# Patient Record
Sex: MALE | Race: WHITE | URBAN - METROPOLITAN AREA
[De-identification: names, ages, dates, MRNs, and addresses within clinical notes are randomized per-mention and may not be internally consistent; named-entity substitution may affect disease eponyms.]

---

## 2017-03-06 ENCOUNTER — OFFICE VISIT (OUTPATIENT)
Dept: OTOLARYNGOLOGY | Facility: CLINIC | Age: 62
End: 2017-03-06

## 2017-03-06 VITALS
HEART RATE: 78 BPM | DIASTOLIC BLOOD PRESSURE: 76 MMHG | SYSTOLIC BLOOD PRESSURE: 114 MMHG | WEIGHT: 240 LBS | BODY MASS INDEX: 33.98 KG/M2 | TEMPERATURE: 98 F | HEIGHT: 70.5 IN

## 2017-03-06 DIAGNOSIS — R22.1 NECK MASS: Primary | ICD-10-CM

## 2017-03-06 PROCEDURE — 99203 OFFICE O/P NEW LOW 30 MIN: CPT | Performed by: OTOLARYNGOLOGY

## 2017-03-06 PROCEDURE — 99212 OFFICE O/P EST SF 10 MIN: CPT | Performed by: OTOLARYNGOLOGY

## 2017-03-07 ENCOUNTER — TELEPHONE (OUTPATIENT)
Dept: OTOLARYNGOLOGY | Facility: CLINIC | Age: 62
End: 2017-03-07

## 2017-03-07 RX ORDER — SULFAMETHOXAZOLE AND TRIMETHOPRIM 800; 160 MG/1; MG/1
1 TABLET ORAL EVERY 12 HOURS
Qty: 20 TABLET | Refills: 0 | Status: SHIPPED | OUTPATIENT
Start: 2017-03-07 | End: 2017-03-17

## 2017-03-07 NOTE — TELEPHONE ENCOUNTER
Order and LOV note faxed to Dr. Sanchez See office; confirmation rec'd. Per Dr. Yoko perry, it will take a few hours for the order and LOV note to be rec'd through their EMR.   Pt would like to know if he needs to pick anything up from Dr. Yoko perry

## 2017-03-07 NOTE — TELEPHONE ENCOUNTER
Patient calling again states was told to call pcp's office for prior authorization for ct scan but when he called pcp was told to call our office to obtain prior auth since JDO referred patient to get CT scan.  Patient already scheduled CT at [facility]

## 2017-03-08 NOTE — TELEPHONE ENCOUNTER
Pt informed we rec'd a faxed order from Dr. Godwin Player office RE:  Order for CT SOFT TISSUE NECK. Order faxed to [address]; confirmation rec'd.

## 2017-03-09 ENCOUNTER — TELEPHONE (OUTPATIENT)
Dept: OTOLARYNGOLOGY | Facility: CLINIC | Age: 62
End: 2017-03-09

## 2017-03-09 NOTE — TELEPHONE ENCOUNTER
Pt dropped disc of CT Neck W/ Contrast at the [facility] office, disc placed in [employer] mailbox. Pt would like a CB once disc has been reviewed.

## 2017-03-10 ENCOUNTER — TELEPHONE (OUTPATIENT)
Dept: OTOLARYNGOLOGY | Facility: CLINIC | Age: 62
End: 2017-03-10

## 2017-03-10 NOTE — TELEPHONE ENCOUNTER
Informed pt JDO is out of the office until next week and that he may schedule a f/u appt w/ JDO to review results. Pt verbalized understanding; scheduled f/u visit w/ JDO for 3/13.

## 2017-03-10 NOTE — TELEPHONE ENCOUNTER
pt called. CT of his Neck was done at [facility]. He dropped off his test results on a CD disc yesterday to the [facility] office. He states this is painful and is asking for the test results. Please advise.

## 2017-03-13 ENCOUNTER — OFFICE VISIT (OUTPATIENT)
Dept: OTOLARYNGOLOGY | Facility: CLINIC | Age: 62
End: 2017-03-13

## 2017-03-13 VITALS
HEIGHT: 70 IN | TEMPERATURE: 99 F | WEIGHT: 240 LBS | BODY MASS INDEX: 34.36 KG/M2 | SYSTOLIC BLOOD PRESSURE: 121 MMHG | DIASTOLIC BLOOD PRESSURE: 86 MMHG

## 2017-03-13 DIAGNOSIS — R22.1 NECK MASS: Primary | ICD-10-CM

## 2017-03-13 PROCEDURE — 99212 OFFICE O/P EST SF 10 MIN: CPT | Performed by: OTOLARYNGOLOGY

## 2017-03-13 PROCEDURE — 99214 OFFICE O/P EST MOD 30 MIN: CPT | Performed by: OTOLARYNGOLOGY

## 2017-03-13 RX ORDER — PENICILLIN V POTASSIUM 500 MG/1
TABLET ORAL
Refills: 0 | COMMUNITY
Start: 2017-03-06

## 2017-03-13 RX ORDER — IBUPROFEN 800 MG/1
TABLET ORAL
Refills: 0 | COMMUNITY
Start: 2017-03-06

## 2017-03-14 ENCOUNTER — TELEPHONE (OUTPATIENT)
Dept: OTOLARYNGOLOGY | Facility: CLINIC | Age: 62
End: 2017-03-14

## 2017-03-14 NOTE — PROGRESS NOTES
Patrice Jade is a 57year old male. Patient presents with:  Results: CT soft tissue of neck done 3-9      HISTORY OF PRESENT ILLNESS  3-4 year history of a stable right neck pass posteriorly. Recently has increased in size and is very tender recently.  No a and affect. Neck Exam Normal Inspection - Posterior neck mass on the right side. on posterior neck.  . Parotid gland - Normal. Thyroid gland - Normal.   Eyes Normal Conjunctiva - Right: Normal, Left: Normal. Pupil - Right: Normal, Left: Normal. Fundus - R

## 2017-03-14 NOTE — TELEPHONE ENCOUNTER
Patient calling to schedule surgery with BRIEN -- patient states spoke with Jose Briggs yesterday and stated that there was an opening for this Thursday 03/16 for surgery would like to know if he can schedule then. please advise.  Thank you

## 2017-03-14 NOTE — TELEPHONE ENCOUNTER
Pt contacted, scheduled surgery for 4/6/17 with Dr. Yumiko Lindo. Pre-post op instructions reviewed.

## 2017-03-29 ENCOUNTER — TELEPHONE (OUTPATIENT)
Dept: OTOLARYNGOLOGY | Facility: CLINIC | Age: 62
End: 2017-03-29

## 2017-03-29 NOTE — TELEPHONE ENCOUNTER
pt called. He is scheduled for surgery 4/6/17 with JDO. pt wants to be sure his insurance is in order, need any pre-op testing and is surgery is a go. Please call. Thank you.

## 2017-03-31 ENCOUNTER — TELEPHONE (OUTPATIENT)
Dept: OTOLARYNGOLOGY | Facility: CLINIC | Age: 62
End: 2017-03-31

## 2017-03-31 RX ORDER — CLINDAMYCIN HYDROCHLORIDE 300 MG/1
300 CAPSULE ORAL 3 TIMES DAILY
Qty: 21 CAPSULE | Refills: 0 | Status: SHIPPED | OUTPATIENT
Start: 2017-03-31 | End: 2017-04-07

## 2017-03-31 NOTE — TELEPHONE ENCOUNTER
Per pt the cyst on the right side if his neck has been growing in size over the last 2 weeks. Today it started draining pus and blood and pt has been continuously changing bandages.  Pt stated the area around the cyst is red and purple and has developed sma

## 2017-03-31 NOTE — TELEPHONE ENCOUNTER
pt called. Is to have surgery 4/6/17 for a cyst on neck. Cyst has grown in size, its leaking a lot with blood and gunk. Very, Very painful. He is pretty certain its infected.   Tried to transferred, was asked to take a TE.

## 2017-04-01 ENCOUNTER — OFFICE VISIT (OUTPATIENT)
Dept: OTOLARYNGOLOGY | Facility: CLINIC | Age: 62
End: 2017-04-01

## 2017-04-01 VITALS
DIASTOLIC BLOOD PRESSURE: 80 MMHG | SYSTOLIC BLOOD PRESSURE: 140 MMHG | BODY MASS INDEX: 33 KG/M2 | TEMPERATURE: 98 F | WEIGHT: 230 LBS

## 2017-04-01 DIAGNOSIS — L02.11 ABSCESS OF NECK: Primary | ICD-10-CM

## 2017-04-01 PROCEDURE — 10061 I&D ABSCESS COMP/MULTIPLE: CPT | Performed by: OTOLARYNGOLOGY

## 2017-04-01 PROCEDURE — 99214 OFFICE O/P EST MOD 30 MIN: CPT | Performed by: OTOLARYNGOLOGY

## 2017-04-01 NOTE — PROGRESS NOTES
Jeanie Jackson is a 57year old male. Patient presents with:  Lump: Lump on his neck back side ,currently infected and taking antibiotics. HISTORY OF PRESENT ILLNESS  3-4 year history of a stable right neck pass posteriorly.  Recently has increased in si depression. Integumentary Negative Frequent skin infections, pigment change and rash. Hema/Lymph Negative Easy bleeding and easy bruising.            PHYSICAL EXAM    /80 mmHg  Temp(Src) 97.7 °F (36.5 °C)  Wt 230 lb (104.327 kg)       Constitution was widely opened using hemostats. The cavity was cleared of debris and pus using suction. This was irrigated with roughly 150 cc of saline as well. The cavity was packed with iodoform gauze. No complications were noted.       Current outpatient prescriptio

## 2017-04-03 ENCOUNTER — OFFICE VISIT (OUTPATIENT)
Dept: OTOLARYNGOLOGY | Facility: CLINIC | Age: 62
End: 2017-04-03

## 2017-04-03 VITALS
HEIGHT: 70 IN | WEIGHT: 236 LBS | BODY MASS INDEX: 33.79 KG/M2 | DIASTOLIC BLOOD PRESSURE: 93 MMHG | SYSTOLIC BLOOD PRESSURE: 151 MMHG | TEMPERATURE: 98 F | HEART RATE: 69 BPM

## 2017-04-03 DIAGNOSIS — L02.11 ABSCESS OF NECK: Primary | ICD-10-CM

## 2017-04-03 PROCEDURE — 99213 OFFICE O/P EST LOW 20 MIN: CPT | Performed by: OTOLARYNGOLOGY

## 2017-04-03 PROCEDURE — 99024 POSTOP FOLLOW-UP VISIT: CPT | Performed by: OTOLARYNGOLOGY

## 2017-04-03 NOTE — PROGRESS NOTES
Marycarmen Mclaughlin is a 57year old male. Patient presents with: Follow - Up: regarding abscess of neck, improvement in symptoms after I&D      HISTORY OF PRESENT ILLNESS    3-4 year history of a stable right neck pass posteriorly.  Recently has increased in [address] pain and diarrhea. Endocrine Negative Cold intolerance and heat intolerance. Neuro Negative Tremors. Psych Negative Anxiety and depression. Integumentary Negative Frequent skin infections, pigment change and rash.    Hema/Lymph Negative Easy bleedin 0  •  penicillin v potassium 500 MG Oral Tab, , Disp: , Rfl: 0  •  aspirin 81 MG Oral Tab, Take 81 mg by mouth daily. , Disp: , Rfl:   ASSESSMENT AND PLAN    1. Abscess of neck  Packing withdrawn 3 inches. Culture taken. Will take to [facility].   Continue packing

## 2017-04-07 ENCOUNTER — TELEPHONE (OUTPATIENT)
Dept: OTOLARYNGOLOGY | Facility: CLINIC | Age: 62
End: 2017-04-07

## 2017-04-07 NOTE — TELEPHONE ENCOUNTER
Pt is post op day 1 Incision and drainage of neck mass, per pt is doing ok small amount of oozing and bleeding from incision site, per pt has change dressing last night and this morning and per pt he does not have any pain  And pt is taking antibiotic as p

## 2017-04-10 ENCOUNTER — OFFICE VISIT (OUTPATIENT)
Dept: OTOLARYNGOLOGY | Facility: CLINIC | Age: 62
End: 2017-04-10

## 2017-04-10 VITALS
BODY MASS INDEX: 32.99 KG/M2 | TEMPERATURE: 98 F | WEIGHT: 233 LBS | SYSTOLIC BLOOD PRESSURE: 128 MMHG | HEIGHT: 70.5 IN | DIASTOLIC BLOOD PRESSURE: 84 MMHG

## 2017-04-10 DIAGNOSIS — L02.11 ABSCESS OF NECK: Primary | ICD-10-CM

## 2017-04-10 PROCEDURE — 99212 OFFICE O/P EST SF 10 MIN: CPT | Performed by: OTOLARYNGOLOGY

## 2017-04-10 PROCEDURE — 99024 POSTOP FOLLOW-UP VISIT: CPT | Performed by: OTOLARYNGOLOGY

## 2017-04-10 RX ORDER — SULFAMETHOXAZOLE AND TRIMETHOPRIM 800; 160 MG/1; MG/1
TABLET ORAL
Refills: 0 | COMMUNITY
Start: 2017-04-06

## 2017-04-11 NOTE — PROGRESS NOTES
Arabella Cain is a 57year old male. Patient presents with:  Post-Op: excision and reconstruction neck mass done on 4-6, pt is doing well       HISTORY OF PRESENT ILLNESS  3-4 year history of a stable right neck pass posteriorly.  Recently has increased in s Neg/Pos Details   Constitutional Negative Fatigue, fever and weight loss. ENMT Negative Drooling. Eyes Negative Blurred vision and vision changes. Respiratory Negative Dyspnea and wheezing.    Cardio Negative Chest pain, irregular heartbeat/palpitatio Normal.       Current outpatient prescriptions:   •  Sulfamethoxazole-TMP -160 MG Oral Tab per tablet, , Disp: , Rfl: 0  •  ibuprofen 800 MG Oral Tab, , Disp: , Rfl: 0  •  penicillin v potassium 500 MG Oral Tab, , Disp: , Rfl: 0  •  aspirin 81 MG Ora

## 2017-04-15 ENCOUNTER — OFFICE VISIT (OUTPATIENT)
Dept: OTOLARYNGOLOGY | Facility: CLINIC | Age: 62
End: 2017-04-15

## 2017-04-15 VITALS
TEMPERATURE: 98 F | HEIGHT: 70.5 IN | BODY MASS INDEX: 32.99 KG/M2 | SYSTOLIC BLOOD PRESSURE: 120 MMHG | WEIGHT: 233 LBS | DIASTOLIC BLOOD PRESSURE: 76 MMHG

## 2017-04-15 DIAGNOSIS — L02.11 ABSCESS OF NECK: Primary | ICD-10-CM

## 2017-04-15 PROCEDURE — 99024 POSTOP FOLLOW-UP VISIT: CPT | Performed by: OTOLARYNGOLOGY

## 2017-04-15 PROCEDURE — 99212 OFFICE O/P EST SF 10 MIN: CPT | Performed by: OTOLARYNGOLOGY

## 2017-04-15 NOTE — PROGRESS NOTES
Anabel Wells is a 57year old male. Patient presents with:   Follow - Up: s/p excision and reconstruction of neck mass done on 4/6/17- per pt there is some drainage, but no pain      HISTORY OF PRESENT ILLNESS    3-4 year history of a stable right neck pass EXCISE LESN NECK/CHEST,SUBCUTAN  4-6-17    Comment posterior    ADJ TISS XFER HEAD,FAC,HAND 10.1-30  4-6-17         REVIEW OF SYSTEMS    System Neg/Pos Details   Constitutional Negative Fatigue, fever and weight loss. ENMT Negative Drooling.    Eyes Negat Nose/Mouth/Throat Normal External nose - Normal. Lips/teeth/gums - Normal. Tonsils - Normal. Oropharynx - Normal.   Nose/Mouth/Throat Normal Nares - Right: Normal Left: Normal. Septum -Normal  Turbinates - Right: Normal, Left: Normal.       Current outpa

## 2017-04-20 ENCOUNTER — OFFICE VISIT (OUTPATIENT)
Dept: OTOLARYNGOLOGY | Facility: CLINIC | Age: 62
End: 2017-04-20

## 2017-04-20 VITALS
SYSTOLIC BLOOD PRESSURE: 130 MMHG | TEMPERATURE: 97 F | DIASTOLIC BLOOD PRESSURE: 80 MMHG | WEIGHT: 233 LBS | BODY MASS INDEX: 32.99 KG/M2 | HEIGHT: 70.5 IN

## 2017-04-20 DIAGNOSIS — L02.11 ABSCESS OF NECK: Primary | ICD-10-CM

## 2017-04-20 PROCEDURE — 99212 OFFICE O/P EST SF 10 MIN: CPT | Performed by: OTOLARYNGOLOGY

## 2017-04-20 PROCEDURE — 99214 OFFICE O/P EST MOD 30 MIN: CPT | Performed by: OTOLARYNGOLOGY

## 2017-04-20 NOTE — PROGRESS NOTES
Marielena Valencia is a 57year old male. Patient presents with: Follow - Up: s/p excision and reconstruction of neck mass done 4/6/17      HISTORY OF PRESENT ILLNESS  3-4 year history of a stable right neck pass posteriorly.  Recently has increased in size and Brother        No past medical history on file.         Past Surgical History    ORAL SURGERY      Comment gum surgery    EXCISE LESN NECK/CHEST,SUBCUTAN  4-6-17    Comment posterior    ADJ TISS XFER HEAD,FAC,HAND 10.1-30  4-6-17         REVIEW OF SYSTEMS Submandibular. Anterior cervical. Posterior cervical. Supraclavicular.         Nose/Mouth/Throat Normal External nose - Normal. Lips/teeth/gums - Normal. Tonsils - Normal. Oropharynx - Normal.   Nose/Mouth/Throat Normal Nares - Right: Normal Left: Normal. S

## 2017-04-28 ENCOUNTER — TELEPHONE (OUTPATIENT)
Dept: OTOLARYNGOLOGY | Facility: CLINIC | Age: 62
End: 2017-04-28

## 2017-04-28 NOTE — TELEPHONE ENCOUNTER
Pt is post op day 1 neck cyst excision and closure. Per pt is doing well, incision is dry an intact. Pt taking antibiotic as prescribed and pain medication as needed.  Advised pt to contact our office if symptoms change or worsen such as bleeding, drainage,

## 2017-05-05 ENCOUNTER — OFFICE VISIT (OUTPATIENT)
Dept: OTOLARYNGOLOGY | Facility: CLINIC | Age: 62
End: 2017-05-05

## 2017-05-05 VITALS
WEIGHT: 235 LBS | SYSTOLIC BLOOD PRESSURE: 124 MMHG | DIASTOLIC BLOOD PRESSURE: 84 MMHG | HEIGHT: 70 IN | BODY MASS INDEX: 33.64 KG/M2 | TEMPERATURE: 98 F

## 2017-05-05 DIAGNOSIS — L02.11 ABSCESS OF NECK: Primary | ICD-10-CM

## 2017-05-05 PROCEDURE — 99212 OFFICE O/P EST SF 10 MIN: CPT | Performed by: OTOLARYNGOLOGY

## 2017-05-05 RX ORDER — HYDROCODONE BITARTRATE AND ACETAMINOPHEN 5; 325 MG/1; MG/1
TABLET ORAL
Refills: 0 | COMMUNITY
Start: 2017-04-27

## 2017-05-05 RX ORDER — CEPHALEXIN 500 MG/1
CAPSULE ORAL
Refills: 0 | COMMUNITY
Start: 2017-04-27

## 2017-05-05 NOTE — PROGRESS NOTES
Ruy Balderas is a 57year old male. Patient presents with:  Post-Op: excision and closure of neck cyst done on 4-27, pt is doing well       HISTORY OF PRESENT ILLNESS  3-4 year history of a stable right neck pass posteriorly.  Recently has increased in size Never Used                        Alcohol Use: Yes             Drug Use: No                Family History   Problem Relation Age of Onset   • Cancer Mother    • Cancer Maternal Grandmother    • Cancer Brother        History reviewed.  No pertinent past medi Lips/teeth/gums - Normal. Tonsils - Normal. Oropharynx - Normal.   Ears Normal Inspection - Right: Normal, Left: Normal. Canal - Right: Normal, Left: Normal. TM - Right: Normal, Left: Normal.   Skin Normal Inspection - Normal.        Lymph Detail Normal [place]

## (undated) NOTE — MR AVS SNAPSHOT
[facility]  [address], [address]  [phone]               Thank you for choosing us for your health care visit with Ellen Rios.  Dylan Yip MD.  We are glad to serve you and happy to provide you with this summary Dietary sodium reduction Reduce dietary sodium intake to <= 100 mmol per day (2.4 g sodium or 6 g sodium chloride)   Aerobic physical activity Regular aerobic physical activity (e.g., brisk walking, light jogging, cycling, swimming, etc.) for a goal of at

## (undated) NOTE — Clinical Note
[address] [place] [address], [address] [place] [address] [place]        03/06/2017        Patient: Marielena Dates   YOB: 1955   Date of Visit: 3/6/2017       Dear   [address] [place] [address],      Thank you for referring Marielena Dates to my practice.   Please find

## (undated) NOTE — MR AVS SNAPSHOT
[facility]  [address], [address]  [phone]               Thank you for choosing us for your health care visit with Fallon Hayward.  Benny Manzanares MD.  We are glad to serve you and happy to provide you with this summary [employer] will allow you to access patient instructions from your recent visit,  view other health information, and more. To sign up or find more information, go to [email or web address] and click on the Sign Up Now link in the [employer] box.      Enter

## (undated) NOTE — MR AVS SNAPSHOT
[address]  [phone]               Thank you for choosing us for your health care visit with Kaleb Grimaldo.  Edgar Pa MD.  We are glad to serve you and happy to provide you with this summar

## (undated) NOTE — MR AVS SNAPSHOT
[facility]  [address], [address]  [phone]               Thank you for choosing us for your health care visit with Joselin Frias.  Sam Sales MD.  We are glad to serve you and happy to provide you with this summary Your unique [employer] Access Code: [email or web address]  Expires: 5/5/2017 10:10 PM    If you have questions, you can call [phone] to talk to our [facility] Staff. Remember, [employer] is NOT to be used for urgent needs. For medical emergencies, dial 911.

## (undated) NOTE — MR AVS SNAPSHOT
[address]  [phone]               Thank you for choosing us for your health care visit with Ilir Porras.  David Josue MD.  We are glad to serve you and happy to provide you with this summar

## (undated) NOTE — MR AVS SNAPSHOT
[address]  [phone]               Thank you for choosing us for your health care visit with Lima Islas.  Duglas Mendoza MD.  We are glad to serve you and happy to provide you with this summar

## (undated) NOTE — MR AVS SNAPSHOT
[facility]  [address], [address]  [phone]               Thank you for choosing us for your health care visit with Abhishek Haynes.  Jimmy Jara MD.  We are glad to serve you and happy to provide you with this summary

## (undated) NOTE — MR AVS SNAPSHOT
[facility]  [address], [address]  [phone]               Thank you for choosing us for your health care visit with Celine Kearney MD.  We are glad to serve you and happy to provide you with this summary